# Patient Record
Sex: FEMALE
[De-identification: names, ages, dates, MRNs, and addresses within clinical notes are randomized per-mention and may not be internally consistent; named-entity substitution may affect disease eponyms.]

---

## 2018-05-10 ENCOUNTER — APPOINTMENT (OUTPATIENT)
Dept: RADIOLOGY | Facility: CLINIC | Age: 28
End: 2018-05-10

## 2018-05-10 ENCOUNTER — OUTPATIENT (OUTPATIENT)
Dept: OUTPATIENT SERVICES | Facility: HOSPITAL | Age: 28
LOS: 1 days | End: 2018-05-10

## 2018-05-10 PROBLEM — Z00.00 ENCOUNTER FOR PREVENTIVE HEALTH EXAMINATION: Status: ACTIVE | Noted: 2018-05-10

## 2018-11-02 ENCOUNTER — OFFICE (OUTPATIENT)
Dept: URBAN - METROPOLITAN AREA CLINIC 119 | Facility: CLINIC | Age: 28
End: 2018-11-02

## 2018-11-02 VITALS
DIASTOLIC BLOOD PRESSURE: 72 MMHG | HEIGHT: 64 IN | WEIGHT: 190 LBS | HEART RATE: 89 BPM | SYSTOLIC BLOOD PRESSURE: 110 MMHG

## 2018-11-02 DIAGNOSIS — Z80.0 FAMILY HISTORY OF MALIGNANT NEOPLASM OF COLON: ICD-10-CM

## 2018-11-02 DIAGNOSIS — K60.2 FISSURE, ANAL: ICD-10-CM

## 2018-11-02 DIAGNOSIS — Z83.71 FAMILY HISTORY OF COLON POLYPS: ICD-10-CM

## 2018-11-02 DIAGNOSIS — K62.5 RECTAL BLEEDING: ICD-10-CM

## 2018-11-02 PROCEDURE — 46600 DIAGNOSTIC ANOSCOPY SPX: CPT | Performed by: INTERNAL MEDICINE

## 2018-11-02 PROCEDURE — 99203 OFFICE O/P NEW LOW 30 MIN: CPT | Performed by: INTERNAL MEDICINE

## 2018-11-02 NOTE — SERVICEHPINOTES
Ms. Weller is a pleasant 28 year old female who presents for the evaluation of hemorrhoids.  She had knee surgery a few years ago and   pain medications triggered constipation and bleeding thought to be due to hemorrhoids.  She tried eating fiber and cutting out meat and dairy which has helped with bloating and diarrhea.  She denies abdominal pain, fevers, chills, weight loss.  Her sister was diagnosed with polyps at the age of 34.  Her mother and maternal grandfather also have a history of polyps.